# Patient Record
(demographics unavailable — no encounter records)

---

## 2025-01-16 NOTE — REASON FOR VISIT
[Symptom and Test Evaluation] : symptom and test evaluation [FreeTextEntry1] : 81F comes in for a visit. She is a poor historian. Most of her care is in [facility]. No chest pain noted. Recent echo was limited. No recent labs done.  We are discussing cardiac care and further testing.

## 2025-01-16 NOTE — REASON FOR VISIT
[Symptom and Test Evaluation] : symptom and test evaluation [FreeTextEntry1] : 81F comes in for a visit. She is a poor historian. Most of her care is in [facility]. No chest pain noted. Recent echo was limited. No recent labs done.  We are discussing cardiac care and further testing.

## 2025-01-16 NOTE — DISCUSSION/SUMMARY
[FreeTextEntry1] : Palpitations check a 7 day holter if able to approve and schedule. CHF/SOB check echo if able to approve and schedule. HTN - WILFREDO  and I had an extensive discussion regarding her blood pressure management. Patient will continue taking current medications in addition to maintaining a low Na diet, with periodic b/p checks at home. HLD WILFREDO and I discussed her lipid panel and individualized target LDL goal. At this point, will do diet and exercise with anticipation of re-evaluating labs in 3-6 months EKG AF with slow response.   [EKG obtained to assist in diagnosis and management of assessed problem(s)] : EKG obtained to assist in diagnosis and management of assessed problem(s)

## 2025-02-28 NOTE — ADDENDUM
[FreeTextEntry1] :   I, Rosi Mccarthy, am scribing for and the presence of Dr. Gabriele Acosta the following sections: HPI, PMH,Family/social history, ROS, Physical Exam, Assessment / Plan.  I, Gabriele Acosta, hereby attest that the medical record entry for this patient accurately reflects signatures/notations that I made on the Date of Service in my capacity as an Attending Physician when I treated/diagnosed the above patient. I do hereby attest that this information is true, accurate and complete to the best of my knowledge.  I was present for the entire visit and supervised the entire visit including assessing the patient, conducting exam and establishing the plan of care.  I personally performed the evaluation and management services and agree with the plan as outlined above.

## 2025-02-28 NOTE — DISCUSSION/SUMMARY
[FreeTextEntry1] : 82 y/o F, with PMHx of HTN, chronic heart failure with mid-range EF (EF:52%), who was referred for initial evaluation of atrial fibrillation.   1) Atrial Fibrillation  Unclear how long patient has been in afib, according to recent holter monitor 1/15-1/21/25 , patient had been in afib 100% of the time  Currently on metoprolol xl 25 mg daily for rate control  We discussed in detail the normal conduction system of the heart and what atrial fibrillation is.  We discussed the natural progression of this arrhythmia in the context of any existing comorbidities.  We also discussed the association between atrial fibrillation and thrombotic events / stroke.  We discussed DCCV. Patient would like to speak with her niece first prior to undergoing DCCV.   2) Stroke RIsk  Tolerating eliquis without any complaints  Patient to follow-up with her niece. She will follow-up with us and we will plan for DCCV in March 2025.  [EKG obtained to assist in diagnosis and management of assessed problem(s)] : EKG obtained to assist in diagnosis and management of assessed problem(s)

## 2025-02-28 NOTE — DISCUSSION/SUMMARY
[FreeTextEntry1] : 80 y/o F, with PMHx of HTN, chronic heart failure with mid-range EF (EF:52%), who was referred for initial evaluation of atrial fibrillation.   1) Atrial Fibrillation  Unclear how long patient has been in afib, according to recent holter monitor 1/15-1/21/25 , patient had been in afib 100% of the time  Currently on metoprolol xl 25 mg daily for rate control  We discussed in detail the normal conduction system of the heart and what atrial fibrillation is.  We discussed the natural progression of this arrhythmia in the context of any existing comorbidities.  We also discussed the association between atrial fibrillation and thrombotic events / stroke.  We discussed DCCV. Patient would like to speak with her niece first prior to undergoing DCCV.   2) Stroke RIsk  Tolerating eliquis without any complaints  Patient to follow-up with her niece. She will follow-up with us and we will plan for DCCV in March 2025.  [EKG obtained to assist in diagnosis and management of assessed problem(s)] : EKG obtained to assist in diagnosis and management of assessed problem(s)

## 2025-02-28 NOTE — PHYSICAL EXAM
[Well Developed] : well developed [Well Nourished] : well nourished [No Acute Distress] : no acute distress [Normal Conjunctiva] : normal conjunctiva [Normal Venous Pressure] : normal venous pressure [No Carotid Bruit] : no carotid bruit [No Gallop] : no gallop [Clear Lung Fields] : clear lung fields [Good Air Entry] : good air entry [No Respiratory Distress] : no respiratory distress  [Soft] : abdomen soft [Non Tender] : non-tender [No Masses/organomegaly] : no masses/organomegaly [Normal Bowel Sounds] : normal bowel sounds [Normal Gait] : normal gait [No Edema] : no edema [No Cyanosis] : no cyanosis [No Clubbing] : no clubbing [No Varicosities] : no varicosities [No Rash] : no rash [No Skin Lesions] : no skin lesions [Moves all extremities] : moves all extremities [No Focal Deficits] : no focal deficits [Normal Speech] : normal speech [Alert and Oriented] : alert and oriented [Normal memory] : normal memory [de-identified] : + irregularly, irregular pulse

## 2025-02-28 NOTE — HISTORY OF PRESENT ILLNESS
[FreeTextEntry1] : 82 y/o F, with PMHx of HTN, chronic heart failure with reduced EF (EF 52%), who was referred from Dr. Warren for atrial fibrillation. She states she has fatigue "all the time." especially if she walks a lot. She does not get shortness of breath but does state she feels fatigued the more she ambulates. She denies history of DCCV or ablation. She denies any issues with bleeding on eliquis.

## 2025-02-28 NOTE — CARDIOLOGY SUMMARY
[de-identified] : ECG 2/28/25: atrial fibrillation with ventricular rate of 62 bpm [de-identified] : 1/15-1/21/25: Atrial Fibrillation 100%, Pauses 645 up to 3.9 seconds

## 2025-02-28 NOTE — CARDIOLOGY SUMMARY
[de-identified] : ECG 2/28/25: atrial fibrillation with ventricular rate of 62 bpm [de-identified] : 1/15-1/21/25: Atrial Fibrillation 100%, Pauses 645 up to 3.9 seconds

## 2025-06-13 NOTE — PHYSICAL EXAM
[Well Developed] : well developed [Well Nourished] : well nourished [No Acute Distress] : no acute distress [Normal Conjunctiva] : normal conjunctiva [Normal Venous Pressure] : normal venous pressure [No Carotid Bruit] : no carotid bruit [No Gallop] : no gallop [Clear Lung Fields] : clear lung fields [Good Air Entry] : good air entry [No Respiratory Distress] : no respiratory distress  [Soft] : abdomen soft [Non Tender] : non-tender [No Masses/organomegaly] : no masses/organomegaly [Normal Bowel Sounds] : normal bowel sounds [Normal Gait] : normal gait [No Edema] : no edema [No Cyanosis] : no cyanosis [No Clubbing] : no clubbing [No Varicosities] : no varicosities [No Rash] : no rash [No Skin Lesions] : no skin lesions [Moves all extremities] : moves all extremities [No Focal Deficits] : no focal deficits [Normal Speech] : normal speech [Alert and Oriented] : alert and oriented [Normal memory] : normal memory [de-identified] : + irregularly, irregular pulse

## 2025-06-13 NOTE — PHYSICAL EXAM
[Well Developed] : well developed [Well Nourished] : well nourished [No Acute Distress] : no acute distress [Normal Conjunctiva] : normal conjunctiva [Normal Venous Pressure] : normal venous pressure [No Carotid Bruit] : no carotid bruit [No Gallop] : no gallop [Clear Lung Fields] : clear lung fields [Good Air Entry] : good air entry [No Respiratory Distress] : no respiratory distress  [Soft] : abdomen soft [Non Tender] : non-tender [No Masses/organomegaly] : no masses/organomegaly [Normal Bowel Sounds] : normal bowel sounds [Normal Gait] : normal gait [No Edema] : no edema [No Cyanosis] : no cyanosis [No Clubbing] : no clubbing [No Varicosities] : no varicosities [No Rash] : no rash [No Skin Lesions] : no skin lesions [Moves all extremities] : moves all extremities [No Focal Deficits] : no focal deficits [Normal Speech] : normal speech [Alert and Oriented] : alert and oriented [Normal memory] : normal memory [de-identified] : + irregularly, irregular pulse

## 2025-06-13 NOTE — CARDIOLOGY SUMMARY
[de-identified] : 6/13/25 AF @ 73 bpm  ECG 2/28/25: atrial fibrillation with ventricular rate of 62 bpm [de-identified] : 1/15-1/21/25: Atrial Fibrillation 100%, Pauses 645 up to 3.9 seconds

## 2025-06-13 NOTE — HISTORY OF PRESENT ILLNESS
[FreeTextEntry1] : Ms. Harrison is a pleasant 82 year old female with a past medical history significant for chronic heart failure with reduced EF (EF 45% on Echo 6/4/25), who was referred from Dr. Warren for atrial fibrillation. She states she has fatigue "all the time." especially if she walks a lot. She does not get shortness of breath but does state she feels fatigued the more she ambulates. She denies history of DCCV or ablation.  She was offered a cardioversion and presents to discuss in more detail.  She continues to c/o fatigue.  Tolerating Eliquis without bleeding issues.

## 2025-06-13 NOTE — DISCUSSION/SUMMARY
[FreeTextEntry1] : Ms. Harrison is a pleasant 81 year old female with a past medical history significant for chronic heart failure with reduced EF (EF 45% on Echo 6/2025)  1) Atrial Fibrillation  Persistent atrial fibrillation with CVR Recommend BRONSON/DCCV for restoration of sinus rhythm Continue Toprol XL for rate control Discussed AAD vs. ablation pending clinical progress  2) Stroke Risk  CHADS VASc at least 3 Continue Eliquis for TE/CVA ppx   F/U 4 weeks post procedure

## 2025-06-13 NOTE — ADDENDUM
[FreeTextEntry1] : I, Isha Parikh, am scribing for and the presence of Dr. Acosta the following sections: HPI, PMH,Family/social history, ROS, Physical Exam, Assessment / Plan.   I, Gabriele Acosta, personally performed the services described in the documentation, reviewed the documentation recorded by the scribe in my presence and it accurately and completely records my words and actions.

## 2025-07-21 NOTE — CARDIOLOGY SUMMARY
[de-identified] : AF with low voltage. [de-identified] : 1. Left ventricular cavity is normal in size. Left ventricular wall thickness is mildly increased. Left ventricular systolic function is mildly decreased with an ejection fraction of 45 % by Nloan's method of disks. There are no regional wall motion abnormalities seen. 2. Normal left ventricular diastolic function, with normal left ventricular filling pressure. 3. Normal right ventricular cavity size, with normal wall thickness, and normal right ventricular systolic function. 4. Left atrium is mildly dilated. 5. Mild to moderate mitral regurgitation. 6. No pericardial effusion seen. 7. Fibrocalcific aortic valve sclerosis without stenosis. 8. There is mild calcification of the mitral valve annulus.

## 2025-07-21 NOTE — PHYSICAL EXAM
[Normal] : no edema, no cyanosis, no clubbing, no varicosities [No Edema] : no edema [de-identified] : Low JVP [de-identified] : Irregularly irregular

## 2025-07-21 NOTE — HISTORY OF PRESENT ILLNESS
[FreeTextEntry1] :  83 yo woman new patient to HF clinic, referred by Dr. Gaines. She was diagnosed with HFpEF at [facility] and was started on high dose Entresto, MRA, SGLT2i and Toprol. She has AF and is on chronic Eliquis (since at least 5 years).  She has a history of WES but lost her CPAP since she was non adherent due to claustrophopia.  She also has a history of lung cancer for which she received XRT (unclear if also chemo). I have no further details of this at the moment but she states it is in remission.   Her main complaint is fatigue and leg tiredness when she tries to walk. She denies orthopnea, PND or LE edema.  She ambulates with a rolling walker and has ET of ~5 blocks.  She is not on diuretics.  Experiences frequent numbness and tingling in both hands L>R and has chronic back pain. Has not been hospitalized for HF.  Last CMP in Jan showed hyperkalemia with K ~5.4 with normal Cr - says high K has been an issue for her and she has been prescribed Lokelma in the past.   Of not she was referred to EP and is scheduled for a BRONSON DCCV this week to attempt rhythm control. Holter showed % of the time.

## 2025-07-21 NOTE — ASSESSMENT
[FreeTextEntry1] : 84 yo woman with HFpEF on quadruple GDMT (MRA, ARNI, BB and SGLT2i)  No congestive symptoms but significant fatigue and limitation of ET, ?b/l CTS symptoms pBNP ~1800 with normal renal function ECG with low voltage and persistent AF on Holter TTE shows mildly reduced LVEF with no major valvular abnormalities, LA mildly dilated, +/- speckled pattern  - PYP scan to r/o aTTRCM - CMP, pBNP today - if hyperK will reduce [place] to half dose - Continue current medications - Sleep medicine consult  - RTC 2 months, if no amyloidosis will santhosh ALLAY trial  Jace Armendariz MD